# Patient Record
Sex: FEMALE | Race: WHITE | URBAN - METROPOLITAN AREA
[De-identification: names, ages, dates, MRNs, and addresses within clinical notes are randomized per-mention and may not be internally consistent; named-entity substitution may affect disease eponyms.]

---

## 2022-10-11 NOTE — PROGRESS NOTES
ASSESSMENT/PLAN:  Below is the assessment and plan developed based on review of pertinent history, physical exam, labs, studies, and medications. 1. Knee pain, unspecified chronicity, unspecified laterality  -     XR KNEE LT MIN 4 V; Future  2. Tear of medial meniscus of left knee, unspecified tear type, unspecified whether old or current tear, sequela  -     MRI KNEE LT WO CONT; Future  -     REFERRAL TO PHYSICAL THERAPY  3. Tear of medial meniscus of right knee, unspecified tear type, unspecified whether old or current tear, initial encounter  -     REFERRAL TO PHYSICAL THERAPY      Return for Follow-up after diagnostic test.    In discussion with the patient, we considered the numerus possible diagnoses that could be contributing to their present symptoms. We also deliberated on the extensive management options that must be considered to treat their current condition. We reviewed their accessible prior medical records, diagnostic tests, and current health and employment information. We considered how these symptoms were affecting the patient´s activities of daily living as well as employment and fitness activities. The patient had various questions regarding the possible risks, benefits, complications, morbidity and mortality regarding their diagnosis and treatment options. The patients´ comorbidities were considered, and I advocated that they consider maximizing lifestyle modification through nutrition and exercise to aid in addressing their symptoms. Shared decision making yielded an understanding to move forward with conservation treatment preferences. The patient expressed understanding that if conservative management fails to alleviate the present symptoms they will return to office for re-evaluation and consideration of additional diagnostic tests and potential surgical options.     In the interim, we have recommended ice, elevation, and take prescription anti-inflammatory medications along with a physician directed home exercise program. We discussed the risks and common side effects of anti-inflammatory medications and instructed the patient to discontinue the medication and contact us if they experienced any side effects. The patient was encouraged to discuss the possible side effects with their family physician or pharmacist prior to initiating any new medications. We discussed the fact that many of the recommended treatment options presented are significantly limited by the patient´s social determinants of health. We also reviewed the circumstances surrounding the environment that they live and work which affect a wide range of health risk. We considered the limited access to appropriate educational resources regarding proper nutrition and exercise as well as the economic and social support necessary to maintain health and wellbeing. Given that the patient's symptoms are increasing in frequency and duration, we have decided to evaluate the etiology of the pain and loss of function with an MRI. We discussed the risks of an MRI which include, but are not limited to the enclosed space, noisy environment, magnetic effect on implanted metal. We also talked about the fact that MRI is also contraindicated in the presence of internal metallic objects such as bullets or shrapnel, as well as surgical clips, pins, plates, screws, metal sutures, or wire mesh. We talked about the fact that MRI does not use radiation, but it may be contrindicated if the patient has implanted pacemakers, intracranial aneurysm clips, cochlear implants, certain prosthetic devices, implanted drug infusion pumps, neurostimulators, bone-growth stimulators, certain intrauterine contraceptive devices; or any other type of iron-based metal implants. We discussed the fact that if you are pregnant or suspect that you may be pregnant, you should notify your physician and consult with your primary care or obstetrician before having an MRI. Although rare, we talked about the fact that if contrast dye is used, there is a risk for allergic reaction to the dye. Patients who are allergic to or sensitive to medications, contrast dye, iodine, or shellfish should notify the radiologist or technologist prior to the administration of dye. MRI contrast may also influence other conditions such as allergies, asthma, anemia, hypotension (low blood pressure), and sickle cell disease. The patient has expressed understanding of these risks and I will see the patient back after the MRI to discuss the findings as well as the treatment options. Given that the patient's symptoms are increasing in frequency and duration we have decided to prescribe physical therapy. We talked about the fact that the goal of physical therapy is for the therapist to assist in developing a program to help return the patient to full strength, function and mobility and decrease pain. We also discussed that the therapist may combine several techniques to help decrease pain. These include but are not limited to stretching, balance exercises, strength training, massage, cold and heat therapy, and electrical stimulation. Although, physical therapy is generally safe, we went over the potential risks to include the worsening of pre-existing conditions, continued pain and no improvement in flexibility, mobility, and strength. We will have the patient follow up after physical therapy to closely monitor their progress. We talked about following up sooner if therapy is not progressing on a weekly basis. We talked about the fact we were concerned for meniscus tear in her left knee. We will start some formal physical therapy. I did offer her cortisone shot which she declined. She will continue her anti-inflammatory medication. We will proceed with an MRI of her left knee to further evaluate her medial meniscus.   I will see her back after MRI discussed findings as well as the treatment options. SUBJECTIVE/OBJECTIVE:  Amber Hernández (: 1975) is a 41 y.o. female, patient,here for evaluation of the Knee Pain (Left knee pain x 2 weeks)  . Patient presents for evaluation for left knee pain. She denies any injury or accident to the left knee. She states the pain has been bothering her for a few weeks. She denies any mechanical symptoms. Denies any significant swelling. She describes her pain as dull and achy and typically worse at the end of the day. Of note she has a history of a prior right knee arthroscopy with medial meniscectomy back in . She recently saw another orthopedic provider and was diagnosed with a retear of her medial meniscus on the right. She underwent physical therapy and states that improved her symptoms. PHYSICAL EXAM:    Upon examination of the left knee, the patient is nontender to palpation along the medial and lateral joint lines, and has no effusion. They are tender to palpation along the medial and lateral facets of the patella. They have crepitus of the patellofemoral joint with range of motion and discomfort with patella grind testing. The patient has no discomfort with Gaurav´s maneuvers, and the knee is stable. They have full range of motion. They have 5/5 strength, and are neurovascularly intact distally. There is no erythema, warmth or skin lesions present. On examination of the contralateral extremity, the patient is nontender to palpation and has excellent range of motion, stability and strength. IMAGING:    X-rays performed today in the office 4 views of the left knee demonstrate mild medial joint space narrowing bilaterally. No obvious fractures or dislocations.     Allergies   Allergen Reactions    Sulfa (Sulfonamide Antibiotics) Hives       Current Outpatient Medications   Medication Sig    escitalopram oxalate (LEXAPRO) 10 mg tablet escitalopram 10 mg tablet   TAKE 1 TABLET BY MOUTH EVERY DAY    levothyroxine (Synthroid) 125 mcg tablet Synthroid 125 mcg tablet   TAKE 1 TABLET BY MOUTH EVERY DAY IN THE MORNING    cholecalciferol, vitamin D3, 50 mcg (2,000 unit) tab 2 tab(s)    MAGNESIUM GLYCINATE PO 1 tab(s)     No current facility-administered medications for this visit. History reviewed. No pertinent past medical history. History reviewed. No pertinent surgical history. History reviewed. No pertinent family history. Social History     Socioeconomic History    Marital status: SINGLE     Spouse name: Not on file    Number of children: Not on file    Years of education: Not on file    Highest education level: Not on file   Occupational History    Not on file   Tobacco Use    Smoking status: Not on file    Smokeless tobacco: Not on file   Substance and Sexual Activity    Alcohol use: Not on file    Drug use: Not on file    Sexual activity: Not on file   Other Topics Concern    Not on file   Social History Narrative    Not on file     Social Determinants of Health     Financial Resource Strain: Not on file   Food Insecurity: Not on file   Transportation Needs: Not on file   Physical Activity: Not on file   Stress: Not on file   Social Connections: Not on file   Intimate Partner Violence: Not on file   Housing Stability: Not on file       Review of Systems    No flowsheet data found. Vitals:  Ht 5' 6\" (1.676 m)   Wt 225 lb (102.1 kg)   BMI 36.32 kg/m²    Body mass index is 36.32 kg/m². An electronic signature was used to authenticate this note.   -- Madi Odell MD

## 2022-10-12 ENCOUNTER — OFFICE VISIT (OUTPATIENT)
Dept: ORTHOPEDIC SURGERY | Age: 47
End: 2022-10-12
Payer: COMMERCIAL

## 2022-10-12 VITALS — HEIGHT: 66 IN | WEIGHT: 225 LBS | BODY MASS INDEX: 36.16 KG/M2

## 2022-10-12 DIAGNOSIS — M25.569 KNEE PAIN, UNSPECIFIED CHRONICITY, UNSPECIFIED LATERALITY: Primary | ICD-10-CM

## 2022-10-12 DIAGNOSIS — S83.241A TEAR OF MEDIAL MENISCUS OF RIGHT KNEE, UNSPECIFIED TEAR TYPE, UNSPECIFIED WHETHER OLD OR CURRENT TEAR, INITIAL ENCOUNTER: ICD-10-CM

## 2022-10-12 DIAGNOSIS — S83.242S TEAR OF MEDIAL MENISCUS OF LEFT KNEE, UNSPECIFIED TEAR TYPE, UNSPECIFIED WHETHER OLD OR CURRENT TEAR, SEQUELA: ICD-10-CM

## 2022-10-12 PROCEDURE — 99204 OFFICE O/P NEW MOD 45 MIN: CPT | Performed by: ORTHOPAEDIC SURGERY

## 2022-10-12 RX ORDER — ESCITALOPRAM OXALATE 10 MG/1
TABLET ORAL
COMMUNITY
Start: 2021-12-01

## 2022-10-12 RX ORDER — CHOLECALCIFEROL (VITAMIN D3) 125 MCG
CAPSULE ORAL
COMMUNITY

## 2022-10-12 RX ORDER — LEVOTHYROXINE SODIUM 125 UG/1
TABLET ORAL
COMMUNITY
Start: 2021-12-13

## 2022-10-18 ENCOUNTER — OFFICE VISIT (OUTPATIENT)
Dept: ORTHOPEDIC SURGERY | Age: 47
End: 2022-10-18
Payer: COMMERCIAL

## 2022-10-18 DIAGNOSIS — M25.562 PAIN IN BOTH KNEES, UNSPECIFIED CHRONICITY: Primary | ICD-10-CM

## 2022-10-18 DIAGNOSIS — S83.241A TEAR OF MEDIAL MENISCUS OF RIGHT KNEE, UNSPECIFIED TEAR TYPE, UNSPECIFIED WHETHER OLD OR CURRENT TEAR, INITIAL ENCOUNTER: ICD-10-CM

## 2022-10-18 DIAGNOSIS — S83.242S TEAR OF MEDIAL MENISCUS OF LEFT KNEE, UNSPECIFIED TEAR TYPE, UNSPECIFIED WHETHER OLD OR CURRENT TEAR, SEQUELA: ICD-10-CM

## 2022-10-18 DIAGNOSIS — M25.561 PAIN IN BOTH KNEES, UNSPECIFIED CHRONICITY: Primary | ICD-10-CM

## 2022-10-18 PROCEDURE — 97161 PT EVAL LOW COMPLEX 20 MIN: CPT | Performed by: PHYSICAL THERAPIST

## 2022-10-18 PROCEDURE — 97140 MANUAL THERAPY 1/> REGIONS: CPT | Performed by: PHYSICAL THERAPIST

## 2022-10-18 PROCEDURE — 97110 THERAPEUTIC EXERCISES: CPT | Performed by: PHYSICAL THERAPIST

## 2022-10-18 NOTE — PROGRESS NOTES
KNEE EVALUATION    Patient Name: Rudy Fam  YRPK:  : 1975  [x]  Patient  Verified  Payor: Kaden Ram / Plan: Herman Spencer PPO / Product Type: PPO /    Total Treatment Time (min): 45  Total Timed Codes (min): 45    Referring Physician:   Janes Martinez MD       1. Pain in both knees, unspecified chronicity      SUBJECTIVE  Patient is a 82-year-old female referred to physical therapy by Dr. Jaycob Stroud for bilateral knee pain. Left is hurting greater than right currently. Of note she has a history of right knee arthroscopy in 2014. She does state she had a MRI earlier this year that shows a retear of her meniscus. Pain located along the medial aspect describes as sharp and shooting. Left knee pain diffusely located along the anterior aspect as well as in the back of the knee, occasional radiation down the outer leg. Symptoms began with gradual onset about a month ago. Reports Dr. Jaycob Stroud believes she has a Baker's cyst.  Aggravated by prolonged sitting. She reports she does okay with walking. Taking ibuprofen and aspirin, using icy hot. She works from home and reports she is not physically active. Her goals for PT are to improve her general level of activity and be able to sit without pain. PMH: thyroid disorder    Gait  Ambulates with unremarkable gait    Functional tests  Able to rise on toes and on heels without pain. Able to maintain single-leg stance easily for 10 seconds bilaterally. Squat is quad dominant with slight lateral shift to the left but pain-free    Observations  Very mild effusion left mid patella    Palpation  Tenderness to left medial joint line and posteromedial aspect, less so on the right    AROM  Full pain-free motion 0 to 120 degrees bilaterally    PROM  No pain with overpressure into hyperextension or overpressure into flexion. Hip mobility is within normal limits and pain-free bilaterally    Strength  Left quad 4 -/5, right 4/5.   Bilateral hip abductor 4 -/5.  Otherwise 5/5    Flexibility  Restricted left greater than right hip flexor mobility. Restricted bilateral quadriceps and hamstring mobility noted. Joint mobility  Hypomobile right superior/inferior patella mobilization    Special tests  Negative Gaurav's, varus/valgus stress testing    LEFS  34    Treatment:  Performed evaluation (20 minutes). Manual therapy (15 minutes): MFR/STM to left peripatellar structures, quad, IT band in modified Gib Sukhdeep test position. KT tape applied along medial/lateral patella     Therapeutic exercise (10 minutes): Provided and instructed the patient in a home exercise program focused on quad/hip strengthening and flexibility. Provided her with a green Thera-Band. We discussed patient goals and collaborated about a progressive plan of care. ASSESSMENT    Patient presents with impairments related to quad/hip strength, proprioception, flexibility, ability to ambulate long distances, negotiate stairs, and participate in desired activity second to bilateral knee pain. She has a known right meniscal tear, possible left meniscal tear versus OA. She will benefit from skilled outpatient physical therapy services to address above limitations and maximize function. Short-Term Goals (1 week)  1. Patient will be independent with home exercise program to facilitate recovery. Long-Term Goals (4-6 weeks)  1. Patient will report at least 25% improvement in bilateral knee pain. 2. Patient will be able to sit for more than 30 minutes without increased pain. 3. Patient will be able to walk 1 mile without difficulty or increased pain. 4. Patient will independently perform all self-directed ADLs without exacerbation of pain from baseline. Plan:    2x weekly. Duration 20 visits.     Interventions to include but are not limited to joint mobilizations, soft tissue mobilization, myofascial release, therapeutic exercise, neuromuscular reeducation, dry needling, taping, and modalities as indicated. Darinel Castro, PT 10/18/2022  11:03 AM    The referring physician has reviewed and approved this evaluation and plan of care as noted by the electronic signature attached to note.

## 2022-10-20 ENCOUNTER — OFFICE VISIT (OUTPATIENT)
Dept: ORTHOPEDIC SURGERY | Age: 47
End: 2022-10-20
Payer: COMMERCIAL

## 2022-10-20 DIAGNOSIS — M25.561 PAIN IN BOTH KNEES, UNSPECIFIED CHRONICITY: Primary | ICD-10-CM

## 2022-10-20 DIAGNOSIS — M25.562 PAIN IN BOTH KNEES, UNSPECIFIED CHRONICITY: Primary | ICD-10-CM

## 2022-10-20 PROCEDURE — 97110 THERAPEUTIC EXERCISES: CPT | Performed by: PHYSICAL THERAPIST

## 2022-10-20 PROCEDURE — 97140 MANUAL THERAPY 1/> REGIONS: CPT | Performed by: PHYSICAL THERAPIST

## 2022-10-20 NOTE — PROGRESS NOTES
PT DAILY TREATMENT NOTE    Patient Name: Carlo Alexandra  NNEA:  : 1975  [x]  Patient  Verified  Payor: Ariel Payne / Plan: Angella Gould PPO / Product Type: PPO /    Total Treatment Time (min): 55  Total Timed Codes (min): 55  Referring Physician: Rosalinda Cerda MD     Treatment Area: Bilateral knees    SUBJECTIVE  Found tape to be helpful, really not having any pain. No questions or problems with home exercises    OBJECTIVE  Modality:   []  E-Stim: type _ x _ min     []att   []unatt   []w/US   []w/ice   []w/heat  []  Ultrasound: []cont   []pulse    _ W/cm2 x _  min   []1MHz   []3MHz  []  Ice pack _  min       []  Hot pack _  min       []  Other:     Therapeutic Exercise: (minutes: 40)  [x] see exercise log      Added/Changed Exercises:  []  Added:   []  Changed:       Manual Therapy: (minutes: 15)  Bilateral patellofemoral joint mobilization. MFR/STM to peripatellar structures, quad, IT band in modified Bryce Shames test position. .  KT tape applied along bilateral medial/lateral patellas      Patient Education: [] Review HEP    [] Progressed/Changed HEP:      Other Objective/Functional Measures:     ASSESSMENT  []  See Plan of Care  []  See progress note/recertification  [x]  Patient will continue to benefit from skilled therapy to address remaining functional deficits:     Responding well to taping and reported feeling good at end of treatment. We will follow her again next week      ICD-10-CM ICD-9-CM    1.  Pain in both knees, unspecified chronicity  M25.561 719.46     M25.562            PLAN  [x] Progress as tolerated under current plan towards long-term goals  [] Discharge  [] Other:        PT Exercise Log         Activity/Exercise Date  10/20/22      Slant board x     Lateral walk x      Leg press 90lbs x     TG with band x     Clams with PPT x     Long arc quad 4lbs x     TKE x                                                                       Dash Randhawa, PT 10/20/2022  10:13 AM

## 2022-10-28 ENCOUNTER — OFFICE VISIT (OUTPATIENT)
Dept: ORTHOPEDIC SURGERY | Age: 47
End: 2022-10-28
Payer: COMMERCIAL

## 2022-10-28 DIAGNOSIS — M25.562 PAIN IN BOTH KNEES, UNSPECIFIED CHRONICITY: Primary | ICD-10-CM

## 2022-10-28 DIAGNOSIS — M25.561 PAIN IN BOTH KNEES, UNSPECIFIED CHRONICITY: Primary | ICD-10-CM

## 2022-10-28 PROCEDURE — 97140 MANUAL THERAPY 1/> REGIONS: CPT | Performed by: PHYSICAL THERAPIST

## 2022-10-28 PROCEDURE — 97110 THERAPEUTIC EXERCISES: CPT | Performed by: PHYSICAL THERAPIST

## 2022-10-28 NOTE — PROGRESS NOTES
PT DAILY TREATMENT NOTE    Patient Name: Erik Chavira  ODZR:  : 1975  [x]  Patient  Verified  Payor: Jason Zamora / Plan: Eryn Leija PPO / Product Type: PPO /    Total Treatment Time (min): 55  Total Timed Codes (min): 55  Referring Physician: Tor Jiang MD     Treatment Area: Bilateral knees    SUBJECTIVE  Left knee feeling great, right still bothering her some. OBJECTIVE  Modality:   []  E-Stim: type _ x _ min     []att   []unatt   []w/US   []w/ice   []w/heat  []  Ultrasound: []cont   []pulse    _ W/cm2 x _  min   []1MHz   []3MHz  []  Ice pack _  min       []  Hot pack _  min       []  Other:     Therapeutic Exercise: (minutes: 40)  [x] see exercise log      Added/Changed Exercises:  []  Added:   []  Changed:       Manual Therapy: (minutes: 15)  Right patellofemoral joint mobilization. MFR/STM to peripatellar structures, quad, IT band in modified Sruthi Beltrami test position. .  KT tape applied along right medial/lateral patella      Patient Education: [] Review HEP    [] Progressed/Changed HEP:      Other Objective/Functional Measures:     ASSESSMENT  []  See Plan of Care  []  See progress note/recertification  [x]  Patient will continue to benefit from skilled therapy to address remaining functional deficits:     Significant improvement in left knee pain, she does have some tightness in her right IT band. Overall does seem to be responding well to current plan of care. She has to miss next week as she is out of town for work, we will follow her again the following week. Encouraged to keep up with her exercises. ICD-10-CM ICD-9-CM    1.  Pain in both knees, unspecified chronicity  M25.561 719.46     M25.562            PLAN  [x] Progress as tolerated under current plan towards long-term goals  [] Discharge  [] Other:        PT Exercise Log         Activity/Exercise Date  10/28/22      Slant board x     Lateral walk x      Leg press 90lbs x     TG with band x     Clams with PPT x     Long arc quad 4lbs x     TKE x     Bike x10' x                                                                 Marie Roberts, PT 10/28/2022  10:13 AM

## 2022-11-07 ENCOUNTER — OFFICE VISIT (OUTPATIENT)
Dept: ORTHOPEDIC SURGERY | Age: 47
End: 2022-11-07
Payer: COMMERCIAL

## 2022-11-07 DIAGNOSIS — S83.241A TEAR OF MEDIAL MENISCUS OF RIGHT KNEE, UNSPECIFIED TEAR TYPE, UNSPECIFIED WHETHER OLD OR CURRENT TEAR, INITIAL ENCOUNTER: ICD-10-CM

## 2022-11-07 DIAGNOSIS — M25.562 PAIN IN BOTH KNEES, UNSPECIFIED CHRONICITY: Primary | ICD-10-CM

## 2022-11-07 DIAGNOSIS — S83.242S TEAR OF MEDIAL MENISCUS OF LEFT KNEE, UNSPECIFIED TEAR TYPE, UNSPECIFIED WHETHER OLD OR CURRENT TEAR, SEQUELA: ICD-10-CM

## 2022-11-07 DIAGNOSIS — M25.561 PAIN IN BOTH KNEES, UNSPECIFIED CHRONICITY: Primary | ICD-10-CM

## 2022-11-07 DIAGNOSIS — M25.569 KNEE PAIN, UNSPECIFIED CHRONICITY, UNSPECIFIED LATERALITY: ICD-10-CM

## 2022-11-07 PROCEDURE — 97140 MANUAL THERAPY 1/> REGIONS: CPT | Performed by: PHYSICAL THERAPIST

## 2022-11-07 PROCEDURE — 97110 THERAPEUTIC EXERCISES: CPT | Performed by: PHYSICAL THERAPIST

## 2022-11-07 NOTE — PROGRESS NOTES
PT DAILY TREATMENT NOTE    Patient Name: Katia Minor  Date:2022  : 1975  [x]  Patient  Verified  Payor: Maeve Torres / Plan: Kristopher Benedict PPO / Product Type: PPO /    Total Treatment Time (min): 55  Total Timed Codes (min): 55  Referring Physician: Hanna Cee MD     Treatment Area: Bilateral knees    SUBJECTIVE  Both knees are doing better overall. OBJECTIVE  Modality:   []  E-Stim: type _ x _ min     []att   []unatt   []w/US   []w/ice   []w/heat  []  Ultrasound: []cont   []pulse    _ W/cm2 x _  min   []1MHz   []3MHz  []  Ice pack _  min       []  Hot pack _  min       []  Other:     Therapeutic Exercise: (minutes: 40)  [x] see exercise log      Added/Changed Exercises:  []  Added:   []  Changed:       Manual Therapy: (minutes: 15)  Right patellofemoral joint mobilization. MFR/STM to peripatellar structures, quad, IT band in modified Garon Clarke test position. .  KT tape applied along right medial/lateral patella      Patient Education: [] Review HEP    [] Progressed/Changed HEP:      Other Objective/Functional Measures:     ASSESSMENT  []  See Plan of Care  []  See progress note/recertification  [x]  Patient will continue to benefit from skilled therapy to address remaining functional deficits:     Significant improvement in left knee pain, she does have some tightness in her right IT band, quad and hip flexor. ICD-10-CM ICD-9-CM    1. Pain in both knees, unspecified chronicity  M25.561 719.46     M25.562        2. Tear of medial meniscus of left knee, unspecified tear type, unspecified whether old or current tear, sequela  S83.242S 905.7       3. Tear of medial meniscus of right knee, unspecified tear type, unspecified whether old or current tear, initial encounter  S83.241A 836.0       4.  Knee pain, unspecified chronicity, unspecified laterality  M25.569 719.46           PLAN  [x] Progress as tolerated under current plan towards long-term goals  [] Discharge  [] Other:        PT Exercise Log         Activity/Exercise Date  11/07/22      Slant board x     Lateral walk x      Leg press 90lbs x     TG with band x     Clams with PPT x     Long arc quad 4lbs x     TKE x     Bike x10' x                                                                 Perry, PT 11/7/2022  10:13 AM

## 2022-12-07 ENCOUNTER — OFFICE VISIT (OUTPATIENT)
Dept: ORTHOPEDIC SURGERY | Age: 47
End: 2022-12-07
Payer: COMMERCIAL

## 2022-12-07 DIAGNOSIS — R26.2 DIFFICULTY WALKING: ICD-10-CM

## 2022-12-07 DIAGNOSIS — M25.562 PAIN IN BOTH KNEES, UNSPECIFIED CHRONICITY: Primary | ICD-10-CM

## 2022-12-07 DIAGNOSIS — M25.561 PAIN IN BOTH KNEES, UNSPECIFIED CHRONICITY: Primary | ICD-10-CM

## 2022-12-07 NOTE — PROGRESS NOTES
PT DAILY TREATMENT NOTE    Patient Name: Cintia Grissom  Date:2022  : 1975  [x]  Patient  Verified  Payor: Raman Duncan / Plan: Ann Cancel PPO / Product Type: PPO /    Total Treatment Time (min): 55  Total Timed Codes (min): 55  Referring Physician: Nahed Baker MD     Treatment Area: Bilateral knees    SUBJECTIVE  Patient returns after last visit a month ago today. She states her knees have been bothering her much more recently. Right hurting worse than left. Right hurting along the medial aspect, left on the posterolateral aspect. Mainly aggravated by walking. No notable reason for increased pain. She reports compliance with home exercises    OBJECTIVE  Right knee: Tender over medial joint line. Full active motion. Quad weakness remains, knee extension graded 3+/5. There is pain with Gaurav's testing. Moderate hamstring and hip flexor tightness    Left knee: Tender over distal semitendinosus and IT band. Full active motion. Mild quad weakness, stronger compared to right. Modality:   []  E-Stim: type _ x _ min     []att   []unatt   []w/US   []w/ice   []w/heat  []  Ultrasound: []cont   []pulse    _ W/cm2 x _  min   []1MHz   []3MHz  []  Ice pack _  min       []  Hot pack _  min       []  Other:     Therapeutic Exercise: (minutes: 40)  [x] see exercise log      Added/Changed Exercises:  []  Added:   []  Changed:       Manual Therapy: (minutes: 15)  Bilateral patellofemoral joint mobilization. MFR/STM to peripatellar structures, quad, IT band in supine      Patient Education: [] Review HEP    [] Progressed/Changed HEP:      Other Objective/Functional Measures:     ASSESSMENT  []  See Plan of Care  []  See progress note/recertification  [x]  Patient will continue to benefit from skilled therapy to address remaining functional deficits:     Unfortunately patient has had exacerbation of bilateral knee pain recently, particularly with ambulation. She does have a meniscus tear on the right. Having more pain along the posterolateral aspect on the left. She did fine with all strengthening exercises today, she is coming back to PT tomorrow. ICD-10-CM ICD-9-CM    1. Pain in both knees, unspecified chronicity  M25.561 719.46     M25.562        2.  Difficulty walking  R26.2 719.7           PLAN  [x] Progress as tolerated under current plan towards long-term goals  [] Discharge  [] Other:        PT Exercise Log         Activity/Exercise Date  12/07/22      Slant board x     Lateral walk x      Leg press 90lbs x     TG with band x     Clams with PPT x     Long arc quad 4lbs x     TKE x     Bike x10' x                                                                 Darinel Castro, PT 12/7/2022  10:13 AM

## 2022-12-08 ENCOUNTER — OFFICE VISIT (OUTPATIENT)
Dept: ORTHOPEDIC SURGERY | Age: 47
End: 2022-12-08
Payer: COMMERCIAL

## 2022-12-08 DIAGNOSIS — M25.562 PAIN IN BOTH KNEES, UNSPECIFIED CHRONICITY: Primary | ICD-10-CM

## 2022-12-08 DIAGNOSIS — R26.2 DIFFICULTY WALKING: ICD-10-CM

## 2022-12-08 DIAGNOSIS — M25.561 PAIN IN BOTH KNEES, UNSPECIFIED CHRONICITY: Primary | ICD-10-CM

## 2022-12-08 NOTE — PROGRESS NOTES
PT DAILY TREATMENT NOTE    Patient Name: Ray Gonzalez  Date:2022  : 1975  [x]  Patient  Verified  Payor: Kerrie Bonilla / Plan: Todd Anne PPO / Product Type: PPO /    Total Treatment Time (min): 55  Total Timed Codes (min): 55  Referring Physician: Geovanna Kelley MD     Treatment Area: Bilateral knees    SUBJECTIVE  Patient notes continued knee pain. L> R today. No issues after yesterday    OBJECTIVE    Modality:   []  E-Stim: type _ x _ min     []att   []unatt   []w/US   []w/ice   []w/heat  []  Ultrasound: []cont   []pulse    _ W/cm2 x _  min   []1MHz   []3MHz  []  Ice pack _  min       []  Hot pack _  min       []  Other:     Therapeutic Exercise: (minutes: 40)  [x] see exercise log      Added/Changed Exercises:  []  Added:   []  Changed:       Manual Therapy: (minutes: 15)  Bilateral patellofemoral joint mobilization. MFR/STM to peripatellar structures, quad, IT band in supine      Patient Education: [] Review HEP    [] Progressed/Changed HEP:      Other Objective/Functional Measures:     ASSESSMENT  []  See Plan of Care  []  See progress note/recertification  [x]  Patient will continue to benefit from skilled therapy to address remaining functional deficits:     Continues with bilateral knee pain. Doing well with exercises and will try modified hip flexor stretch at home. ICD-10-CM ICD-9-CM    1. Pain in both knees, unspecified chronicity  M25.561 719.46     M25.562        2.  Difficulty walking  R26.2 719.7             PLAN  [x] Progress as tolerated under current plan towards long-term goals  [] Discharge  [] Other:        PT Exercise Log         Activity/Exercise Date  22      Slant board x     Lateral walk x      Leg press 90lbs x     TG with band x     Clams with PPT x     Long arc quad 4lbs x     TKE x     Bike x10' x                                                                 Munira Molina, PT 2022  10:13 AM

## 2022-12-13 ENCOUNTER — OFFICE VISIT (OUTPATIENT)
Dept: ORTHOPEDIC SURGERY | Age: 47
End: 2022-12-13
Payer: COMMERCIAL

## 2022-12-13 DIAGNOSIS — R26.2 DIFFICULTY WALKING: ICD-10-CM

## 2022-12-13 DIAGNOSIS — M25.561 PAIN IN BOTH KNEES, UNSPECIFIED CHRONICITY: Primary | ICD-10-CM

## 2022-12-13 DIAGNOSIS — M25.562 PAIN IN BOTH KNEES, UNSPECIFIED CHRONICITY: Primary | ICD-10-CM

## 2022-12-13 NOTE — PROGRESS NOTES
PT DAILY TREATMENT NOTE    Patient Name: Cali Cooper  YVAR:  : 1975  [x]  Patient  Verified  Payor: Adali Nassar / Plan: Bert Roberts PPO / Product Type: PPO /    Total Treatment Time (min): 55  Total Timed Codes (min): 55  Referring Physician: Cliff Anaya MD     Treatment Area: Bilateral knees    SUBJECTIVE  Sore today, felt it more after doing PT 2 days in a row. However she does states she is doing better overall compared to first visit back last week    OBJECTIVE    Modality:   []  E-Stim: type _ x _ min     []att   []unatt   []w/US   []w/ice   []w/heat  []  Ultrasound: []cont   []pulse    _ W/cm2 x _  min   []1MHz   []3MHz  []  Ice pack _  min       []  Hot pack _  min       []  Other:     Therapeutic Exercise: (minutes: 40)  [x] see exercise log      Added/Changed Exercises:  []  Added:   [x]  Changed: Increased weight on leg press      Manual Therapy: (minutes: 15)  Right patellofemoral joint mobilization. MFR/STM to peripatellar structures, quad, IT band in supine      Patient Education: [] Review HEP    [] Progressed/Changed HEP:      Other Objective/Functional Measures:     ASSESSMENT  []  See Plan of Care  []  See progress note/recertification  [x]  Patient will continue to benefit from skilled therapy to address remaining functional deficits:     Improved pain levels since reinitiating PT last week. She is planning to do PT once a week in conjunction with home program, plan to progress strengthening at next visit        ICD-10-CM ICD-9-CM    1. Pain in both knees, unspecified chronicity  M25.561 719.46     M25.562        2.  Difficulty walking  R26.2 719.7             PLAN  [x] Progress as tolerated under current plan towards long-term goals  [] Discharge  [] Other:        PT Exercise Log         Activity/Exercise Date  22      Slant board x     Lateral walk x      Leg press 100lbs x     TG with band x     Clams with PPT x     Long arc quad 4lbs x     TKE x     Bike x10' x Tucker Ruvalcaba, PT 12/13/2022  10:13 AM

## 2022-12-20 ENCOUNTER — OFFICE VISIT (OUTPATIENT)
Dept: ORTHOPEDIC SURGERY | Age: 47
End: 2022-12-20
Payer: COMMERCIAL

## 2022-12-20 DIAGNOSIS — M25.561 PAIN IN BOTH KNEES, UNSPECIFIED CHRONICITY: Primary | ICD-10-CM

## 2022-12-20 DIAGNOSIS — M25.562 PAIN IN BOTH KNEES, UNSPECIFIED CHRONICITY: Primary | ICD-10-CM

## 2022-12-20 DIAGNOSIS — R26.2 DIFFICULTY WALKING: ICD-10-CM

## 2023-05-26 RX ORDER — ESCITALOPRAM OXALATE 10 MG/1
TABLET ORAL
COMMUNITY
Start: 2021-12-01

## 2023-05-26 RX ORDER — LEVOTHYROXINE SODIUM 0.12 MG/1
TABLET ORAL
COMMUNITY
Start: 2021-12-13